# Patient Record
Sex: MALE | Race: WHITE
[De-identification: names, ages, dates, MRNs, and addresses within clinical notes are randomized per-mention and may not be internally consistent; named-entity substitution may affect disease eponyms.]

---

## 2017-03-26 ENCOUNTER — HOSPITAL ENCOUNTER (EMERGENCY)
Dept: HOSPITAL 13 - EME | Age: 37
Discharge: HOME | End: 2017-03-26
Payer: COMMERCIAL

## 2017-03-26 VITALS — BODY MASS INDEX: 24.28 KG/M2 | HEIGHT: 73 IN | WEIGHT: 183.2 LBS

## 2017-03-26 VITALS — SYSTOLIC BLOOD PRESSURE: 122 MMHG | DIASTOLIC BLOOD PRESSURE: 99 MMHG
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
## 2017-03-26 DIAGNOSIS — M25.512: ICD-10-CM

## 2017-03-26 DIAGNOSIS — S16.1XXA: Primary | ICD-10-CM

## 2017-03-26 DIAGNOSIS — F17.200: ICD-10-CM

## 2017-07-09 ENCOUNTER — HOSPITAL ENCOUNTER (EMERGENCY)
Dept: HOSPITAL 13 - EME | Age: 37
Discharge: HOME | End: 2017-07-09
Payer: COMMERCIAL

## 2017-07-09 VITALS — HEIGHT: 73 IN | WEIGHT: 177.47 LBS | BODY MASS INDEX: 23.52 KG/M2

## 2017-07-09 VITALS — DIASTOLIC BLOOD PRESSURE: 88 MMHG | SYSTOLIC BLOOD PRESSURE: 131 MMHG

## 2017-07-09 DIAGNOSIS — R11.2: ICD-10-CM

## 2017-07-09 DIAGNOSIS — R52: ICD-10-CM

## 2017-07-09 DIAGNOSIS — F11.20: Primary | ICD-10-CM

## 2017-07-09 DIAGNOSIS — F17.200: ICD-10-CM

## 2017-07-09 DIAGNOSIS — E78.5: ICD-10-CM

## 2017-07-09 DIAGNOSIS — R19.7: ICD-10-CM

## 2017-07-09 LAB
ANION GAP: 11 MEQ/L (ref 2–14)
CHLORIDE: 105 MEQ/L (ref 99–109)
EOSINOPHIL # BLD: 0.1 K/UL (ref 0–0.3)
EOSINOPHIL (%): 1.5 % (ref 0–5)
GFR SERPLBLD CREATININE-BSD FMLAMALE: > 59 ML/MIN/
GLUCOSE SERPL-MCNC: 109 MG/DL (ref 70–99)
HCO3 BLD-SCNC: 23 MEQ/L (ref 20–31)
HCT VFR BLD CALC: 44.3 % (ref 38–50)
IMM GRANULOCYTES # BLD: 0 K/UL
IMMATURE GRANULOCYTE (%): 0.4 % (ref 0–0.7)
INSTRUMENT ABS NEUTROPHIL CT: 6.5 K/UL
LYMPHOCYTE COUNT: 1.8 K/UL (ref 1–2.8)
MCH RBC QN AUTO: 28.5 PG (ref 29–34)
MCV: 82 FL (ref 86–99)
MONOCYTE (%): 6.7 % (ref 3–12)
MONOCYTES # BLD MANUAL: 34.8 G/DL (ref 30–36)
MONOCYTES # BLD: 0.6 K/UL (ref 0–0.8)
NEUTROPHIL (%): 70.4 % (ref 45–76)
NEUTROPHIL COUNT: 6.5 K/UL (ref 1.8–6.4)
NRBC (%): 0 /100 WBC (ref 0–0)
PAPPENHEIMER BOD BLD QL SMEAR: 0.1 K/UL (ref 0–0.1)
PLATELET COUNT: 275 K/UL (ref 156–360)
POTASSIUM SERPL-SCNC: 4.3 MEQ/L (ref 3.7–5.4)
RBC # BLD AUTO: 5.4 M/UL (ref 4–5.5)
RBC DIS.WIDTH-CV: 12.4 % (ref 11.8–14.6)
RBC DIS.WIDTH-SD: 37.2 % (ref 39–53)
SERUM ETHYL ALCOHOL: < 10 MG/DL
SODIUM: 139 MEQ/L (ref 136–147)
UREA NITROGEN (BUN): 12 MG/DL (ref 9–23)
VARIANT LYMPHS NFR BLD MANUAL: 10.3 UM^3 (ref 9–12.4)
WBC # BLD AUTO: 9.2 K/UL (ref 4.1–10.2)

## 2017-07-09 PROCEDURE — G0480 DRUG TEST DEF 1-7 CLASSES: HCPCS

## 2017-11-09 ENCOUNTER — APPOINTMENT (OUTPATIENT)
Age: 37
Setting detail: DERMATOLOGY
End: 2017-11-09

## 2017-11-09 DIAGNOSIS — D22 MELANOCYTIC NEVI: ICD-10-CM

## 2017-11-09 DIAGNOSIS — D485 NEOPLASM OF UNCERTAIN BEHAVIOR OF SKIN: ICD-10-CM

## 2017-11-09 DIAGNOSIS — Z71.89 OTHER SPECIFIED COUNSELING: ICD-10-CM

## 2017-11-09 DIAGNOSIS — L30.9 DERMATITIS, UNSPECIFIED: ICD-10-CM

## 2017-11-09 DIAGNOSIS — L91.8 OTHER HYPERTROPHIC DISORDERS OF THE SKIN: ICD-10-CM

## 2017-11-09 PROBLEM — D22.62 MELANOCYTIC NEVI OF LEFT UPPER LIMB, INCLUDING SHOULDER: Status: ACTIVE | Noted: 2017-11-09

## 2017-11-09 PROBLEM — D22.5 MELANOCYTIC NEVI OF TRUNK: Status: ACTIVE | Noted: 2017-11-09

## 2017-11-09 PROBLEM — D48.5 NEOPLASM OF UNCERTAIN BEHAVIOR OF SKIN: Status: ACTIVE | Noted: 2017-11-09

## 2017-11-09 PROBLEM — D22.61 MELANOCYTIC NEVI OF RIGHT UPPER LIMB, INCLUDING SHOULDER: Status: ACTIVE | Noted: 2017-11-09

## 2017-11-09 PROCEDURE — OTHER REASSURANCE: OTHER

## 2017-11-09 PROCEDURE — 99202 OFFICE O/P NEW SF 15 MIN: CPT

## 2017-11-09 PROCEDURE — OTHER TREATMENT REGIMEN: OTHER

## 2017-11-09 PROCEDURE — OTHER ADDITIONAL NOTES: OTHER

## 2017-11-09 PROCEDURE — OTHER COUNSELING: OTHER

## 2017-11-09 PROCEDURE — OTHER MIPS QUALITY: OTHER

## 2017-11-09 PROCEDURE — OTHER PRESCRIPTION: OTHER

## 2017-11-09 RX ORDER — TRIAMCINOLONE ACETONIDE 1 MG/G
CREAM TOPICAL BID
Qty: 45 | Refills: 1 | Status: ERX | COMMUNITY
Start: 2017-11-09

## 2017-11-09 ASSESSMENT — LOCATION DETAILED DESCRIPTION DERM
LOCATION DETAILED: LEFT ULNAR PALM
LOCATION DETAILED: RIGHT INFERIOR CENTRAL MALAR CHEEK
LOCATION DETAILED: RIGHT RADIAL PALM
LOCATION DETAILED: LEFT POSTERIOR NECK
LOCATION DETAILED: RIGHT MEDIAL UPPER BACK
LOCATION DETAILED: LEFT ANTERIOR DISTAL UPPER ARM
LOCATION DETAILED: XIPHOID
LOCATION DETAILED: RIGHT ANTERIOR DISTAL UPPER ARM

## 2017-11-09 ASSESSMENT — LOCATION SIMPLE DESCRIPTION DERM
LOCATION SIMPLE: ABDOMEN
LOCATION SIMPLE: RIGHT HAND
LOCATION SIMPLE: POSTERIOR NECK
LOCATION SIMPLE: RIGHT CHEEK
LOCATION SIMPLE: RIGHT UPPER ARM
LOCATION SIMPLE: LEFT HAND
LOCATION SIMPLE: LEFT UPPER ARM
LOCATION SIMPLE: RIGHT UPPER BACK

## 2017-11-09 ASSESSMENT — LOCATION ZONE DERM
LOCATION ZONE: HAND
LOCATION ZONE: NECK
LOCATION ZONE: FACE
LOCATION ZONE: ARM
LOCATION ZONE: TRUNK

## 2017-11-09 NOTE — PROCEDURE: MIPS QUALITY
Quality 111:Pneumonia Vaccination Status For Older Adults: Pneumococcal Vaccination not Administered or Previously Received, Reason not Otherwise Specified
Detail Level: Detailed
Quality 131: Pain Assessment And Follow-Up: Pain assessment using a standardized tool is documented as negative, no follow-up plan required
Quality 226: Preventive Care And Screening: Tobacco Use: Screening And Cessation Intervention: Patient screened for tobacco and is a smoker AND received Cessation Counseling

## 2017-11-09 NOTE — PROCEDURE: ADDITIONAL NOTES
Additional Notes: L chest as described. Pt relates been present for years s changes. D/w him bx to clarify as most prominent nevus. Declined, wished for obs and will re-eval at f/u.
Additional Notes: Snip exc x2 done, discarded as clinically benign
Additional Notes: C/w ACD, possible allergic, resolved c tx. Emphasized mitigating potential exposures. Above topical if recurrent rash to use prn. Cont anti-histamine prn and emollients. Pt inquiring about testing for possible precipitants and will refer to allergist for possible patch testing. Re-eval at f/u if no intv issues

## 2018-06-17 ENCOUNTER — HOSPITAL ENCOUNTER (EMERGENCY)
Dept: HOSPITAL 13 - EME | Age: 38
Discharge: HOME | End: 2018-06-17
Payer: COMMERCIAL

## 2018-06-17 VITALS — WEIGHT: 182.54 LBS | BODY MASS INDEX: 24.19 KG/M2 | HEIGHT: 73 IN

## 2018-06-17 VITALS — DIASTOLIC BLOOD PRESSURE: 86 MMHG | SYSTOLIC BLOOD PRESSURE: 130 MMHG

## 2018-06-17 DIAGNOSIS — H10.12: Primary | ICD-10-CM

## 2018-08-14 ENCOUNTER — HOSPITAL ENCOUNTER (EMERGENCY)
Dept: HOSPITAL 13 - EME | Age: 38
Discharge: HOME | End: 2018-08-14
Payer: COMMERCIAL

## 2018-08-14 VITALS — SYSTOLIC BLOOD PRESSURE: 132 MMHG | DIASTOLIC BLOOD PRESSURE: 92 MMHG

## 2018-08-14 VITALS — BODY MASS INDEX: 23.87 KG/M2 | HEIGHT: 73 IN | WEIGHT: 180.12 LBS

## 2018-08-14 DIAGNOSIS — E78.5: ICD-10-CM

## 2018-08-14 DIAGNOSIS — F41.9: Primary | ICD-10-CM

## 2018-08-14 DIAGNOSIS — F17.200: ICD-10-CM

## 2018-08-14 DIAGNOSIS — F11.10: ICD-10-CM

## 2022-07-15 NOTE — HPI: RASH
[facility]  [address] [address]  Phone: [phone]  Fax: [phone]    Keron Obando MD        July 15, 2022     Patient: Yoana Zayas   YOB: 1979   Date of Visit: 7/15/2022       To Whom It May Concern: It is my medical opinion that Kesha Villa should remain light duty until after his MRI follow up, approximately 1-2 weeks. If you have any questions or concerns, please don't hesitate to call.     Sincerely,        Keron Obando MD Is This A New Presentation, Or A Follow-Up?: Rash